# Patient Record
Sex: MALE | Race: BLACK OR AFRICAN AMERICAN | Employment: OTHER | URBAN - METROPOLITAN AREA
[De-identification: names, ages, dates, MRNs, and addresses within clinical notes are randomized per-mention and may not be internally consistent; named-entity substitution may affect disease eponyms.]

---

## 2023-12-18 PROBLEM — L81.9 DYSCHROMIA: Status: RESOLVED | Noted: 2023-05-19 | Resolved: 2023-12-18

## 2023-12-18 PROBLEM — T24.331A: Status: ACTIVE | Noted: 2023-04-11

## 2023-12-18 PROBLEM — L91.0 HYPERTROPHIC BURN SCAR: Status: ACTIVE | Noted: 2023-05-19

## 2023-12-26 ENCOUNTER — TELEPHONE (OUTPATIENT)
Age: 63
End: 2023-12-26

## 2023-12-26 DIAGNOSIS — T24.311S FULL THICKNESS BURN OF RIGHT THIGH, SEQUELA: Primary | ICD-10-CM

## 2023-12-26 DIAGNOSIS — T23.221S: ICD-10-CM

## 2023-12-26 NOTE — TELEPHONE ENCOUNTER
----- Message from Skyler Christine sent at 12/26/2023  1:55 PM EST -----  Subject: Referral Request    Reason for referral request? Pt called in and said he needs to get a   referral for burn unit and OT at [facility]. It needs to be back dated to June 30th. Please reach out and let him know once this has been done. Pt said   he needs to date back to when  dropped him. Provider patient wants to be referred to(if known):     Provider Phone Number(if known):     Additional Information for Provider?   ---------------------------------------------------------------------------  --------------  [address] [place] [address] Travis    [phone]; OK to leave message on voicemail  ---------------------------------------------------------------------------  --------------

## 2023-12-28 NOTE — TELEPHONE ENCOUNTER
Placed these referrals. I see that he has been seeing [facility] burn center and [facility] burn Occupational Therapy. I see that these are necessary visits.     Please take note of the dates that he needs when completing this referral with Tiffani

## 2024-01-19 RX ORDER — PRAVASTATIN SODIUM 40 MG
40 TABLET ORAL DAILY
Qty: 90 TABLET | Refills: 3 | Status: SHIPPED | OUTPATIENT
Start: 2024-01-19

## 2024-01-19 NOTE — TELEPHONE ENCOUNTER
CT chest received from [facility].    Scan was obtained for chest wall pain bothersome with coughing and sneezing.  Lung fields look okay.  Some arthritis of the thoracic spine and an apparent history of rib fractures.  These bony issues can alter his rib function are the likely source of his pain.    Posture will help  Physical therapy might help  Anti-inflammatories might help (already prescribed)    There is coronary artery calcification (CAD) this increases risk of heart attack.  Statin medication is recommended to help reduce this risk.    Pravastatin is pended to encounter if agreeable

## 2024-01-30 ENCOUNTER — OFFICE VISIT (OUTPATIENT)
Age: 64
End: 2024-01-30
Payer: OTHER GOVERNMENT

## 2024-01-30 VITALS
SYSTOLIC BLOOD PRESSURE: 142 MMHG | BODY MASS INDEX: 37.29 KG/M2 | OXYGEN SATURATION: 96 % | DIASTOLIC BLOOD PRESSURE: 79 MMHG | TEMPERATURE: 98.3 F | HEIGHT: 69 IN | WEIGHT: 251.8 LBS | HEART RATE: 60 BPM | RESPIRATION RATE: 17 BRPM

## 2024-01-30 DIAGNOSIS — L72.3 SEBACEOUS CYST: ICD-10-CM

## 2024-01-30 DIAGNOSIS — B35.1 ONYCHOMYCOSIS: ICD-10-CM

## 2024-01-30 DIAGNOSIS — M79.18 ABDOMINAL MUSCLE PAIN: ICD-10-CM

## 2024-01-30 DIAGNOSIS — L20.9 ATOPIC DERMATITIS, UNSPECIFIED TYPE: Primary | ICD-10-CM

## 2024-01-30 PROCEDURE — 99214 OFFICE O/P EST MOD 30 MIN: CPT | Performed by: FAMILY MEDICINE

## 2024-01-30 RX ORDER — TRIAMCINOLONE ACETONIDE 1 MG/G
CREAM TOPICAL
Qty: 80 G | Refills: 5 | Status: SHIPPED | OUTPATIENT
Start: 2024-01-30 | End: 2024-01-31 | Stop reason: SDUPTHER

## 2024-01-30 ASSESSMENT — PATIENT HEALTH QUESTIONNAIRE - PHQ9
SUM OF ALL RESPONSES TO PHQ9 QUESTIONS 1 & 2: 0
SUM OF ALL RESPONSES TO PHQ QUESTIONS 1-9: 0
2. FEELING DOWN, DEPRESSED OR HOPELESS: 0
1. LITTLE INTEREST OR PLEASURE IN DOING THINGS: 0
SUM OF ALL RESPONSES TO PHQ QUESTIONS 1-9: 0

## 2024-01-30 NOTE — PROGRESS NOTES
DEV Cordero is a 63 y.o. male who presents with a concern about a rash at the nape of the left neck present for a week or 2.  Started as a few small bumps and now it is a patch of bumps about 5 cm in diameter.  They are 1-2 mm papules with no surrounding erythema.  They are pruritic.    He had a inflamed cyst left inner thigh that began 5 days ago on Thursday and calm down after about 4 days.  Feels fine now.  Has had a bump here for a while.  Sounds like it has become inflamed a few times in the past.  On exam there is a soft inclusion cyst about 1.5 cm in diameter left inner thigh that is nontender to palpation and not apparently actively inflamed.    Having some discomfort in the back and flanks.  Points across the low back and motions to the bilateral flanks.  This is bother some when he is first standing up until he manages to find a position where he is comfortable straightening up.  The more he moves the better he feels.  It does not appear to affect his sleep.  Is been going on for a week or 2.  There is no known injury.  He is less active than usual        PMHx:  No past medical history on file.    Meds:   Current Outpatient Medications   Medication Sig Dispense Refill    ciclopirox (PENLAC) 8 % solution Apply nail lacquer once daily 6 mL 1    triamcinolone (KENALOG) 0.1 % cream Apply topically 2 times daily. 80 g 5    pravastatin (PRAVACHOL) 40 MG tablet Take 1 tablet by mouth daily 90 tablet 3    tacrolimus (PROGRAF) 1 MG capsule Take 1 capsule by mouth in the morning and 1 capsule in the evening.      lisinopril (PRINIVIL;ZESTRIL) 40 MG tablet Take 1 tablet by mouth daily      amLODIPine (NORVASC) 10 MG tablet Take 0.5 tablets by mouth daily      cetirizine (ZYRTEC) 10 MG tablet Take 1 tablet by mouth daily      vitamin E 400 UNIT capsule Take 1 capsule by mouth daily      Magnesium Oxide 420 MG TABS Take 420 mg by mouth 2 times daily      Saw Palmetto, Serenoa repens, (SAW PALMETTO PO) Take by

## 2024-01-30 NOTE — PROGRESS NOTES
Chief Complaint   Patient presents with    Skin Problem         Health Maintenance Due   Topic Date Due    COVID-19 Vaccine (1) Never done    Pneumococcal 0-64 years Vaccine (1 - PCV) Never done    Lipids  Never done    HIV screen  Never done    Hepatitis C screen  Never done    Diabetes screen  Never done    Colorectal Cancer Screen  Never done         \"Have you been to the ER, urgent care clinic since your last visit?  Hospitalized since your last visit?\"    NO    “Have you seen or consulted any other health care providers outside of [facility] since your last visit?”    YES - When: approximately [facility] ago.  Where and Why: Burn Unit.    “Have you had a colorectal cancer screening such as a colonoscopy/FIT/Cologuard?    YES - Type: Colonoscopy - Where: [facility] -pt authorize record release Nurse/CMA to request most recent records if not in the chart

## 2024-01-31 ENCOUNTER — TELEPHONE (OUTPATIENT)
Age: 64
End: 2024-01-31

## 2024-01-31 DIAGNOSIS — L20.9 ATOPIC DERMATITIS, UNSPECIFIED TYPE: ICD-10-CM

## 2024-01-31 DIAGNOSIS — B35.1 ONYCHOMYCOSIS: ICD-10-CM

## 2024-01-31 RX ORDER — TRIAMCINOLONE ACETONIDE 1 MG/G
CREAM TOPICAL
Qty: 80 G | Refills: 5 | Status: SHIPPED | OUTPATIENT
Start: 2024-01-31

## 2024-01-31 NOTE — TELEPHONE ENCOUNTER
Pt is calling stating pharmacy is still stating they haven't received his med  [facility] [place] I have change that

## 2024-02-05 ENCOUNTER — OFFICE VISIT (OUTPATIENT)
Age: 64
End: 2024-02-05
Payer: OTHER GOVERNMENT

## 2024-02-05 VITALS
OXYGEN SATURATION: 95 % | RESPIRATION RATE: 20 BRPM | TEMPERATURE: 97 F | WEIGHT: 245 LBS | DIASTOLIC BLOOD PRESSURE: 76 MMHG | BODY MASS INDEX: 36.29 KG/M2 | SYSTOLIC BLOOD PRESSURE: 133 MMHG | HEART RATE: 66 BPM | HEIGHT: 69 IN

## 2024-02-05 DIAGNOSIS — L02.92 BOIL: Primary | ICD-10-CM

## 2024-02-05 PROCEDURE — 99203 OFFICE O/P NEW LOW 30 MIN: CPT | Performed by: SURGERY

## 2024-02-05 ASSESSMENT — PATIENT HEALTH QUESTIONNAIRE - PHQ9
SUM OF ALL RESPONSES TO PHQ QUESTIONS 1-9: 0
2. FEELING DOWN, DEPRESSED OR HOPELESS: 0
SUM OF ALL RESPONSES TO PHQ QUESTIONS 1-9: 0
SUM OF ALL RESPONSES TO PHQ9 QUESTIONS 1 & 2: 0
1. LITTLE INTEREST OR PLEASURE IN DOING THINGS: 0

## 2024-02-05 NOTE — PROGRESS NOTES
Identified pt with two pt identifiers (name and ). Reviewed chart in preparation for visit and have obtained necessary documentation.    Kentrell Cordero is a 63 y.o. male  Chief Complaint   Patient presents with    Skin Problem     Seen at the request of Dr. Andre Woody, eval cyst on right abdomin.      /76 (Site: Left Upper Arm, Position: Sitting, Cuff Size: Large Adult)   Pulse 66   Temp 97 °F (36.1 °C) (Temporal)   Resp 20   Ht 1.753 m (5' 9\")   Wt 111.1 kg (245 lb)   SpO2 95%   BMI 36.18 kg/m²     1. Have you been to the ER, urgent care clinic since your last visit?  Hospitalized since your last visit?no    2. Have you seen or consulted any other health care providers outside of the [facility] since your last visit?  Include any pap smears or colon screening. no

## 2024-02-06 ENCOUNTER — PREP FOR PROCEDURE (OUTPATIENT)
Age: 64
End: 2024-02-06

## 2024-02-06 DIAGNOSIS — L72.0 EPIDERMAL CYST: ICD-10-CM

## 2024-02-07 ENCOUNTER — HOSPITAL ENCOUNTER (OUTPATIENT)
Facility: HOSPITAL | Age: 64
Discharge: HOME | End: 2024-02-10
Payer: OTHER GOVERNMENT

## 2024-02-07 VITALS
RESPIRATION RATE: 18 BRPM | TEMPERATURE: 97.9 F | BODY MASS INDEX: 37.62 KG/M2 | SYSTOLIC BLOOD PRESSURE: 149 MMHG | HEART RATE: 60 BPM | DIASTOLIC BLOOD PRESSURE: 76 MMHG | HEIGHT: 68 IN | WEIGHT: 248.24 LBS | OXYGEN SATURATION: 97 %

## 2024-02-07 LAB
ANION GAP SERPL CALC-SCNC: 2 MMOL/L (ref 5–15)
BUN SERPL-MCNC: 16 MG/DL (ref 6–20)
BUN/CREAT SERPL: 15 (ref 12–20)
CALCIUM SERPL-MCNC: 8.6 MG/DL (ref 8.5–10.1)
CHLORIDE SERPL-SCNC: 111 MMOL/L (ref 97–108)
CO2 SERPL-SCNC: 28 MMOL/L (ref 21–32)
CREAT SERPL-MCNC: 1.04 MG/DL (ref 0.7–1.3)
ERYTHROCYTE [DISTWIDTH] IN BLOOD BY AUTOMATED COUNT: 12.3 % (ref 11.5–14.5)
GLUCOSE SERPL-MCNC: 98 MG/DL (ref 65–100)
HCT VFR BLD AUTO: 40.4 % (ref 36.6–50.3)
HGB BLD-MCNC: 13.7 G/DL (ref 12.1–17)
MCH RBC QN AUTO: 31.4 PG (ref 26–34)
MCHC RBC AUTO-ENTMCNC: 33.9 G/DL (ref 30–36.5)
MCV RBC AUTO: 92.7 FL (ref 80–99)
NRBC # BLD: 0 K/UL (ref 0–0.01)
NRBC BLD-RTO: 0 PER 100 WBC
PLATELET # BLD AUTO: 115 K/UL (ref 150–400)
PMV BLD AUTO: 11 FL (ref 8.9–12.9)
POTASSIUM SERPL-SCNC: 4.1 MMOL/L (ref 3.5–5.1)
RBC # BLD AUTO: 4.36 M/UL (ref 4.1–5.7)
SODIUM SERPL-SCNC: 141 MMOL/L (ref 136–145)
WBC # BLD AUTO: 3 K/UL (ref 4.1–11.1)

## 2024-02-07 PROCEDURE — 85027 COMPLETE CBC AUTOMATED: CPT

## 2024-02-07 PROCEDURE — 80048 BASIC METABOLIC PNL TOTAL CA: CPT

## 2024-02-07 PROCEDURE — 36415 COLL VENOUS BLD VENIPUNCTURE: CPT

## 2024-02-07 RX ORDER — SODIUM CHLORIDE, SODIUM LACTATE, POTASSIUM CHLORIDE, CALCIUM CHLORIDE 600; 310; 30; 20 MG/100ML; MG/100ML; MG/100ML; MG/100ML
INJECTION, SOLUTION INTRAVENOUS CONTINUOUS
OUTPATIENT
Start: 2024-02-14

## 2024-02-07 RX ORDER — MULTIVIT WITH MINERALS/LUTEIN
1000 TABLET ORAL DAILY
COMMUNITY

## 2024-02-07 ASSESSMENT — PAIN SCALES - GENERAL: PAINLEVEL_OUTOF10: 0

## 2024-02-07 NOTE — PROGRESS NOTES
Wear your hair loose or down, no ponytails, buns, ren pins or clips.  All body piercings must be removed.  Please shower with antibacterial soap for three consecutive days before and on the morning of surgery, but do not apply any lotions, powders or deodorants after the shower on the day of surgery. Please use a fresh towels after each shower. Please sleep in clean clothes and change bed linens the night before surgery.  Please do not shave for 48 hours prior to surgery. Shaving of the face is acceptable.    7. You should understand that if you do not follow these instructions your surgery may be cancelled.  If your physical condition changes (I.e. fever, cold or flu) please contact your surgeon as soon as possible.    8. It is important that you be on time.  If a situation occurs where you may be late, please call [phone] (OR Holding Area).    9. If you have any questions and or problems, please call [phone] (Pre-admission Testing).    10. Your surgery time may be subject to change.  You will receive a phone call the evening prior if your time changes.    11.  If having outpatient surgery, you must have someone to drive you here, stay with you during the duration of your stay, and to drive you home at time of discharge.    Special Instructions: vitamins and supplements and diclofenac per Dr Moctezuma prior to surgery     TAKE ALL MEDICATIONS DAY OF SURGERY EXCEPT: no vitamins and supplements, no topical creams.  May take morning medications with a sip of water.     I understand a pre-operative phone call will be made to verify my surgery time.  In the event that I am not available, I give permission for a message to be left on my answering service and/or with another person?  yes         ___________________      __________   _________    (Signature of Patient)             (Witness)                (Date and Time)

## 2024-02-09 NOTE — PROGRESS NOTES
To: Andre Lopez MD    From: Aaron Moctezuma MD    Thank you for sending Kentrell Cordero to see us.     Please note that this dictation was completed with Entrec, the computer voice recognition software.  Quite often unanticipated grammatical, syntax, homophones, and other interpretive errors are inadvertently transcribed by the software.  Please disregard these errors.  Please excuse any errors that have escaped final proofreading.      Encounter Date: 2/5/2024  History and Physical    Assessment:   Left upper inner thigh chronic skin appendage inflammation.  No cellulitis.    Body mass index is 36.18 kg/m².    No anticoagulation but he is on immunosuppression for prior liver transplant.    Plan:   I recommended excision in the OR due to its location and risk for infection especially in the setting of immunosuppression.    Risks including bleeding and infection were explained to the patient.  The patient expressed understanding of the risks, and all questions were answered to the patient's satisfaction.    Afterwards he should follow-up with dermatology and/or infectious disease.  We will get cultures if there is any abscess.    HPI:   Kentrell Cordero is a 63 y.o. male who is seen in consultation at the request of Andre Lopez MD for evaluation of a cyst on the upper inner left thigh.  It was first noticed several weeks.  It has been inflamed.  It has been painful.  There has not been drainage.    Past Medical History:   Diagnosis Date    At risk for sleep apnea 02/07/2024    stop bang score-5. faxed to pcp- dr andre lopez    Hyperlipidemia     Hypertension     Liver disease     Osteoarthritis      Past Surgical History:   Procedure Laterality Date    LIVER TRANSPLANT  2003    MANDIBLE SURGERY Left     WRIST SURGERY Right 2007      Family History   Problem Relation Age of Onset    Stroke Mother     Heart Attack Mother     Bone Cancer Brother     Cancer Maternal Aunt     Prostate Cancer Paternal Uncle

## 2024-02-09 NOTE — H&P (VIEW-ONLY)
To: Andre Lopez MD    From: Aaron Moctezuma MD    Thank you for sending Kentrell Cordero to see us.     Please note that this dictation was completed with Zumigo, the computer voice recognition software.  Quite often unanticipated grammatical, syntax, homophones, and other interpretive errors are inadvertently transcribed by the software.  Please disregard these errors.  Please excuse any errors that have escaped final proofreading.      Encounter Date: 2/5/2024  History and Physical    Assessment:   Left upper inner thigh chronic skin appendage inflammation.  No cellulitis.    Body mass index is 36.18 kg/m².    No anticoagulation but he is on immunosuppression for prior liver transplant.    Plan:   I recommended excision in the OR due to its location and risk for infection especially in the setting of immunosuppression.    Risks including bleeding and infection were explained to the patient.  The patient expressed understanding of the risks, and all questions were answered to the patient's satisfaction.    Afterwards he should follow-up with dermatology and/or infectious disease.  We will get cultures if there is any abscess.    HPI:   Kentrell Cordero is a 63 y.o. male who is seen in consultation at the request of Andre Lopez MD for evaluation of a cyst on the upper inner left thigh.  It was first noticed several weeks.  It has been inflamed.  It has been painful.  There has not been drainage.    Past Medical History:   Diagnosis Date    At risk for sleep apnea 02/07/2024    stop bang score-5. faxed to pcp- dr andre lopez    Hyperlipidemia     Hypertension     Liver disease     Osteoarthritis      Past Surgical History:   Procedure Laterality Date    LIVER TRANSPLANT  2003    MANDIBLE SURGERY Left     WRIST SURGERY Right 2007      Family History   Problem Relation Age of Onset    Stroke Mother     Heart Attack Mother     Bone Cancer Brother     Cancer Maternal Aunt     Prostate Cancer Paternal Uncle

## 2024-02-14 ENCOUNTER — HOSPITAL ENCOUNTER (OUTPATIENT)
Facility: HOSPITAL | Age: 64
Setting detail: OUTPATIENT SURGERY
Discharge: HOME | End: 2024-02-14
Attending: SURGERY | Admitting: SURGERY
Payer: OTHER GOVERNMENT

## 2024-02-14 ENCOUNTER — ANESTHESIA EVENT (OUTPATIENT)
Facility: HOSPITAL | Age: 64
End: 2024-02-14
Payer: OTHER GOVERNMENT

## 2024-02-14 ENCOUNTER — ANESTHESIA (OUTPATIENT)
Facility: HOSPITAL | Age: 64
End: 2024-02-14
Payer: OTHER GOVERNMENT

## 2024-02-14 VITALS
DIASTOLIC BLOOD PRESSURE: 95 MMHG | HEIGHT: 68 IN | BODY MASS INDEX: 36.72 KG/M2 | HEART RATE: 59 BPM | SYSTOLIC BLOOD PRESSURE: 166 MMHG | RESPIRATION RATE: 14 BRPM | TEMPERATURE: 98 F | OXYGEN SATURATION: 99 % | WEIGHT: 242.29 LBS

## 2024-02-14 DIAGNOSIS — L72.0 EPIDERMAL CYST: Primary | ICD-10-CM

## 2024-02-14 PROCEDURE — 3600000012 HC SURGERY LEVEL 2 ADDTL 15MIN: Performed by: SURGERY

## 2024-02-14 PROCEDURE — 7100000011 HC PHASE II RECOVERY - ADDTL 15 MIN: Performed by: SURGERY

## 2024-02-14 PROCEDURE — 3700000000 HC ANESTHESIA ATTENDED CARE: Performed by: SURGERY

## 2024-02-14 PROCEDURE — 2580000003 HC RX 258: Performed by: SURGERY

## 2024-02-14 PROCEDURE — 6360000002 HC RX W HCPCS: Performed by: SURGERY

## 2024-02-14 PROCEDURE — 7100000001 HC PACU RECOVERY - ADDTL 15 MIN: Performed by: SURGERY

## 2024-02-14 PROCEDURE — 88304 TISSUE EXAM BY PATHOLOGIST: CPT

## 2024-02-14 PROCEDURE — 3600000002 HC SURGERY LEVEL 2 BASE: Performed by: SURGERY

## 2024-02-14 PROCEDURE — 2580000003 HC RX 258: Performed by: ANESTHESIOLOGY

## 2024-02-14 PROCEDURE — 7100000010 HC PHASE II RECOVERY - FIRST 15 MIN: Performed by: SURGERY

## 2024-02-14 PROCEDURE — 6360000002 HC RX W HCPCS: Performed by: ANESTHESIOLOGY

## 2024-02-14 PROCEDURE — 2500000003 HC RX 250 WO HCPCS: Performed by: ANESTHESIOLOGY

## 2024-02-14 PROCEDURE — 2709999900 HC NON-CHARGEABLE SUPPLY: Performed by: SURGERY

## 2024-02-14 PROCEDURE — 7100000000 HC PACU RECOVERY - FIRST 15 MIN: Performed by: SURGERY

## 2024-02-14 PROCEDURE — 3700000001 HC ADD 15 MINUTES (ANESTHESIA): Performed by: SURGERY

## 2024-02-14 RX ORDER — OXYCODONE HYDROCHLORIDE 5 MG/1
5 TABLET ORAL EVERY 6 HOURS PRN
Qty: 12 TABLET | Refills: 0 | Status: SHIPPED | OUTPATIENT
Start: 2024-02-14 | End: 2024-02-17

## 2024-02-14 RX ORDER — BUPIVACAINE HYDROCHLORIDE 5 MG/ML
INJECTION, SOLUTION PERINEURAL PRN
Status: DISCONTINUED | OUTPATIENT
Start: 2024-02-14 | End: 2024-02-14 | Stop reason: ALTCHOICE

## 2024-02-14 RX ORDER — SODIUM CHLORIDE 0.9 % (FLUSH) 0.9 %
5-40 SYRINGE (ML) INJECTION EVERY 12 HOURS SCHEDULED
Status: DISCONTINUED | OUTPATIENT
Start: 2024-02-14 | End: 2024-02-14 | Stop reason: HOSPADM

## 2024-02-14 RX ORDER — HYDROMORPHONE HYDROCHLORIDE 1 MG/ML
0.5 INJECTION, SOLUTION INTRAMUSCULAR; INTRAVENOUS; SUBCUTANEOUS EVERY 5 MIN PRN
Status: DISCONTINUED | OUTPATIENT
Start: 2024-02-14 | End: 2024-02-14 | Stop reason: HOSPADM

## 2024-02-14 RX ORDER — MIDAZOLAM HYDROCHLORIDE 1 MG/ML
INJECTION INTRAMUSCULAR; INTRAVENOUS PRN
Status: DISCONTINUED | OUTPATIENT
Start: 2024-02-14 | End: 2024-02-14 | Stop reason: SDUPTHER

## 2024-02-14 RX ORDER — SODIUM CHLORIDE, SODIUM LACTATE, POTASSIUM CHLORIDE, CALCIUM CHLORIDE 600; 310; 30; 20 MG/100ML; MG/100ML; MG/100ML; MG/100ML
INJECTION, SOLUTION INTRAVENOUS CONTINUOUS
Status: DISCONTINUED | OUTPATIENT
Start: 2024-02-14 | End: 2024-02-14 | Stop reason: HOSPADM

## 2024-02-14 RX ORDER — MEPERIDINE HYDROCHLORIDE 25 MG/ML
12.5 INJECTION INTRAMUSCULAR; INTRAVENOUS; SUBCUTANEOUS EVERY 5 MIN PRN
Status: DISCONTINUED | OUTPATIENT
Start: 2024-02-14 | End: 2024-02-14 | Stop reason: HOSPADM

## 2024-02-14 RX ORDER — PROPOFOL 10 MG/ML
INJECTION, EMULSION INTRAVENOUS PRN
Status: DISCONTINUED | OUTPATIENT
Start: 2024-02-14 | End: 2024-02-14 | Stop reason: SDUPTHER

## 2024-02-14 RX ORDER — SODIUM CHLORIDE 0.9 % (FLUSH) 0.9 %
5-40 SYRINGE (ML) INJECTION PRN
Status: DISCONTINUED | OUTPATIENT
Start: 2024-02-14 | End: 2024-02-14 | Stop reason: HOSPADM

## 2024-02-14 RX ORDER — DEXAMETHASONE SODIUM PHOSPHATE 4 MG/ML
INJECTION, SOLUTION INTRA-ARTICULAR; INTRALESIONAL; INTRAMUSCULAR; INTRAVENOUS; SOFT TISSUE PRN
Status: DISCONTINUED | OUTPATIENT
Start: 2024-02-14 | End: 2024-02-14 | Stop reason: SDUPTHER

## 2024-02-14 RX ORDER — FENTANYL CITRATE 50 UG/ML
INJECTION, SOLUTION INTRAMUSCULAR; INTRAVENOUS PRN
Status: DISCONTINUED | OUTPATIENT
Start: 2024-02-14 | End: 2024-02-14 | Stop reason: SDUPTHER

## 2024-02-14 RX ORDER — SODIUM CHLORIDE 9 MG/ML
INJECTION, SOLUTION INTRAVENOUS PRN
Status: DISCONTINUED | OUTPATIENT
Start: 2024-02-14 | End: 2024-02-14 | Stop reason: HOSPADM

## 2024-02-14 RX ORDER — ONDANSETRON 2 MG/ML
INJECTION INTRAMUSCULAR; INTRAVENOUS PRN
Status: DISCONTINUED | OUTPATIENT
Start: 2024-02-14 | End: 2024-02-14 | Stop reason: SDUPTHER

## 2024-02-14 RX ORDER — FENTANYL CITRATE 50 UG/ML
25 INJECTION, SOLUTION INTRAMUSCULAR; INTRAVENOUS EVERY 5 MIN PRN
Status: DISCONTINUED | OUTPATIENT
Start: 2024-02-14 | End: 2024-02-14 | Stop reason: HOSPADM

## 2024-02-14 RX ORDER — LIDOCAINE HYDROCHLORIDE 20 MG/ML
INJECTION, SOLUTION EPIDURAL; INFILTRATION; INTRACAUDAL; PERINEURAL PRN
Status: DISCONTINUED | OUTPATIENT
Start: 2024-02-14 | End: 2024-02-14 | Stop reason: SDUPTHER

## 2024-02-14 RX ORDER — ONDANSETRON 2 MG/ML
4 INJECTION INTRAMUSCULAR; INTRAVENOUS
Status: DISCONTINUED | OUTPATIENT
Start: 2024-02-14 | End: 2024-02-14 | Stop reason: HOSPADM

## 2024-02-14 RX ORDER — DROPERIDOL 2.5 MG/ML
0.62 INJECTION, SOLUTION INTRAMUSCULAR; INTRAVENOUS
Status: DISCONTINUED | OUTPATIENT
Start: 2024-02-14 | End: 2024-02-14 | Stop reason: HOSPADM

## 2024-02-14 RX ORDER — LIDOCAINE HYDROCHLORIDE 10 MG/ML
1 INJECTION, SOLUTION EPIDURAL; INFILTRATION; INTRACAUDAL; PERINEURAL
Status: DISCONTINUED | OUTPATIENT
Start: 2024-02-14 | End: 2024-02-14 | Stop reason: HOSPADM

## 2024-02-14 RX ORDER — POLYETHYLENE GLYCOL 3350 17 G/17G
17 POWDER, FOR SOLUTION ORAL DAILY
Qty: 116 G | Refills: 0 | Status: SHIPPED | OUTPATIENT
Start: 2024-02-14 | End: 2024-02-21

## 2024-02-14 RX ADMIN — DEXAMETHASONE SODIUM PHOSPHATE 4 MG: 4 INJECTION INTRA-ARTICULAR; INTRALESIONAL; INTRAMUSCULAR; INTRAVENOUS; SOFT TISSUE at 10:28

## 2024-02-14 RX ADMIN — PROPOFOL 170 MG: 10 INJECTION, EMULSION INTRAVENOUS at 10:13

## 2024-02-14 RX ADMIN — WATER 2000 MG: 1 INJECTION INTRAMUSCULAR; INTRAVENOUS; SUBCUTANEOUS at 10:19

## 2024-02-14 RX ADMIN — MIDAZOLAM HYDROCHLORIDE 2 MG: 1 INJECTION, SOLUTION INTRAMUSCULAR; INTRAVENOUS at 10:08

## 2024-02-14 RX ADMIN — SODIUM CHLORIDE, POTASSIUM CHLORIDE, SODIUM LACTATE AND CALCIUM CHLORIDE: 600; 310; 30; 20 INJECTION, SOLUTION INTRAVENOUS at 08:03

## 2024-02-14 RX ADMIN — FENTANYL CITRATE 25 MCG: 50 INJECTION, SOLUTION INTRAMUSCULAR; INTRAVENOUS at 10:28

## 2024-02-14 RX ADMIN — LIDOCAINE HYDROCHLORIDE 60 MG: 20 INJECTION, SOLUTION EPIDURAL; INFILTRATION; INTRACAUDAL; PERINEURAL at 10:13

## 2024-02-14 RX ADMIN — FENTANYL CITRATE 25 MCG: 50 INJECTION, SOLUTION INTRAMUSCULAR; INTRAVENOUS at 10:18

## 2024-02-14 RX ADMIN — PROPOFOL 30 MG: 10 INJECTION, EMULSION INTRAVENOUS at 10:14

## 2024-02-14 RX ADMIN — FENTANYL CITRATE 50 MCG: 50 INJECTION, SOLUTION INTRAMUSCULAR; INTRAVENOUS at 10:35

## 2024-02-14 RX ADMIN — ONDANSETRON 4 MG: 2 INJECTION INTRAMUSCULAR; INTRAVENOUS at 10:28

## 2024-02-14 RX ADMIN — SODIUM CHLORIDE, POTASSIUM CHLORIDE, SODIUM LACTATE AND CALCIUM CHLORIDE: 600; 310; 30; 20 INJECTION, SOLUTION INTRAVENOUS at 10:08

## 2024-02-14 NOTE — ANESTHESIA PRE PROCEDURE
transplant 2003):     (-) GERD       Endo/Other: Negative Endo/Other ROS             Pt had PAT visit.       Abdominal:              PE comment: Deferred   Vascular: negative vascular ROS.         Other Findings:       Anesthesia Plan      general     ASA 2       Induction: intravenous.  BIS  MIPS: Postoperative opioids intended and Prophylactic antiemetics administered.  Anesthetic plan and risks discussed with patient.      Plan discussed with CRNA.    Attending anesthesiologist reviewed and agrees with Preprocedure content            Marielle Saenz MD   2/14/2024

## 2024-02-14 NOTE — DISCHARGE INSTRUCTIONS
Discharge Instructions:  Dr. Moctezuma    Call on next business day to arrange appointment for follow up in 3 weeks (1 week if you have a drain in place) -- [phone].    Activity:  Walk regularly.  No lifting more than 10 -15 pounds for 4 weeks.  Light aerobic activity is okay when you feel up to it.    You may resume driving in three days unless still requiring narcotics for pain.      Work:  You may return to work in 1 or 2 weeks to light activity. No lifting more than 10 pounds (=\"light duty\") for four weeks.    If your employer can not accommodate \"light duty,\" your employer will need to provide any necessary paperwork to our office.   This document with serve as the initial \"note\" to your employer.    Diet:  You may resume normal diet.    Wound Care:  Glue dressing will fall off on its own.  No swimming or baths for 2 weeks.  If you experience a lot of drainage from the incisions, develop redness around the wound, or a fever over 101 F occurs please call the office.      Medications:  See attached \"Medication Reconciliation.\"    PUT ICE ON YOUR INCISION 20 MINUTES EVERY HOUR WHILE AWAKE FOR THE NEXT 3 DAYS AT LEAST.  PLACE A THIN CLOTH BETWEEN YOUR SKIN AND THE ICE BAG.    Miralax should be used twice daily to prevent constipation while on narcotics.  If you are still having trouble having a BM after 1-2 days, try milk of magnesia.  If this does not work within 24 hours, try a bottle of magnesium citrate.  If this does not work, call us.    Narcotics and anesthesia sometimes cause nausea and vomiting.  If persistent please call the office.    Do not hesitate to call with questions or concerns.  Aaron Moctezuma MD  Tel [phone]  Fax [phone]   DISCHARGE SUMMARY from Nurse    PATIENT INSTRUCTIONS:    After general anesthesia or intravenous sedation, for 24 hours or while taking prescription narcotics:    Have someone responsible help you with your care  Limit your activities  Do not drive and operate

## 2024-02-14 NOTE — OP NOTE
DATE OF PROCEDURE:  2/14/2024     PREOPERATIVE DIAGNOSIS:  draining epidermal cyst     POSTOPERATIVE DIAGNOSIS:   same    OPERATIVE PROCEDURE: Cyst excision    SURGEON:  Aaron Moctezuma MD    ANESTHESIA:  General.    EBL: minimal    DRAINS: None    SPECIMENS:   ID Type Source Tests Collected by Time Destination   1 : SKIN CYST Tissue Thigh SURGICAL PATHOLOGY Aaron Moctezuma MD 2/14/2024 1028         FINDINGS:  Chronic sinus tract approximately 0.5-1cm    INDICATIONS:  Chronic discharge.      DESCRIPTION OF PROCEDURE:    After obtaining informed consent the patient was taken the operating room and placed supine on the operating table.  An operative timeout was performed and general anesthesia was achieved.  The patient was then placed in lithotomy position and the area was prepped and draped in the usual manner.  Local anesthesia was infiltrated into the skin and soft tissues surrounding the lesion.  An elliptical incision was made with the lesion at the center.  This was taken down into subcutaneous tissues with blunt and sharp dissection and the lesion was enucleated.  It  was sent for pathologic evaluation.  The incision was closed with 3-0 Vicryl.  Interrupted sutures then a running 4-0 Monocryl in the subcuticular layer.  Glue dressing was then applied.  The patient was recovered from general anesthesia and taken the recovery area in satisfactory condition.  All instrument sponge needle counts were reported as correct.       Aaron Moctezuma MD

## 2024-02-14 NOTE — ANESTHESIA POSTPROCEDURE EVALUATION
Department of Anesthesiology  Postprocedure Note    Patient: Kentrell Cordero  MRN: [id number]  YOB: 1960  Date of evaluation: 2/14/2024    Procedure Summary       Date: 02/14/24 Room / Location: [facility] MA[facility] OR [facility] / [facility] MA[facility] OR    Anesthesia Start: 1008 Anesthesia Stop: 1054    Procedure: EXCISION OF LEFT UPPER INNER THIGH CYST (Left: Thigh) Diagnosis:       Epidermal cyst      (Epidermal cyst [L72.0])    Providers: Aaron Moctezuma MD Responsible Provider: Marielle Saenz MD    Anesthesia Type: general ASA Status: 2            Anesthesia Type: No value filed.    Madhuri Phase I: Madhuri Score: 10    Madhuri Phase II:      Anesthesia Post Evaluation    Patient location during evaluation: bedside  Patient participation: complete - patient participated  Level of consciousness: awake and alert  Pain scale: Controlled per protocol.  Airway patency: patent  Nausea & Vomiting: no nausea and no vomiting  Cardiovascular status: hemodynamically stable  Respiratory status: acceptable  Hydration status: stable  Multimodal analgesia pain management approach  Pain management: adequate    No notable events documented.

## 2024-02-14 NOTE — FLOWSHEET NOTE
02/14/24 1055   Handoff   Communication Given Transfer Handoff   Handoff phase Phase I receiving   Handoff Given To Hazel Leon RN   Handoff Received From BULMARO Khalil RN and Dr. Saenz   Handoff Communication Face to Face;At bedside     1125 Report given to Jo BURT.  Transferred to Phase II for discharge instructions.  Verbal sign out from Dr. Saenz.

## 2024-03-05 ENCOUNTER — OFFICE VISIT (OUTPATIENT)
Age: 64
End: 2024-03-05
Payer: OTHER GOVERNMENT

## 2024-03-05 VITALS
RESPIRATION RATE: 20 BRPM | WEIGHT: 243.6 LBS | HEART RATE: 63 BPM | BODY MASS INDEX: 36.92 KG/M2 | HEIGHT: 68 IN | DIASTOLIC BLOOD PRESSURE: 73 MMHG | OXYGEN SATURATION: 92 % | TEMPERATURE: 97.5 F | SYSTOLIC BLOOD PRESSURE: 155 MMHG

## 2024-03-05 DIAGNOSIS — L72.0 EPIDERMAL CYST: Primary | ICD-10-CM

## 2024-03-05 PROCEDURE — 99212 OFFICE O/P EST SF 10 MIN: CPT | Performed by: SURGERY

## 2024-03-05 RX ORDER — LIDOCAINE HYDROCHLORIDE 10 MG/ML
10 INJECTION, SOLUTION INFILTRATION; PERINEURAL ONCE
Status: DISCONTINUED | OUTPATIENT
Start: 2024-03-05 | End: 2024-03-05

## 2024-03-05 ASSESSMENT — PATIENT HEALTH QUESTIONNAIRE - PHQ9
SUM OF ALL RESPONSES TO PHQ QUESTIONS 1-9: 0
SUM OF ALL RESPONSES TO PHQ9 QUESTIONS 1 & 2: 0
SUM OF ALL RESPONSES TO PHQ QUESTIONS 1-9: 0
1. LITTLE INTEREST OR PLEASURE IN DOING THINGS: 0
SUM OF ALL RESPONSES TO PHQ QUESTIONS 1-9: 0
SUM OF ALL RESPONSES TO PHQ QUESTIONS 1-9: 0
2. FEELING DOWN, DEPRESSED OR HOPELESS: 0

## 2024-03-05 NOTE — PROGRESS NOTES
Status post excision of draining sinus tract in the right upper inner thigh on 2/14/2024.  Pathology revealed chronic cyst with foreign body reaction.    On exam, there is a small spot of skin opening but there is eschar here.  It is otherwise well-healed.  There is no evidence of infection.    Assessment and plan: Appropriate recovery.  Told to call for any concerns.    Thank you.

## 2024-03-05 NOTE — PROGRESS NOTES
Identified pt with two pt identifiers(name and ). Reviewed record in preparation for visit and have obtained necessary documentation. All patient medications has been reviewed.    Chief Complaint   Patient presents with    Post-Op Check      3 Week post op excision of upper thigh cyst       Health Maintenance Due   Topic    Lipids     HIV screen     Hepatitis C screen     Colorectal Cancer Screen     Pneumococcal 0-64 years Vaccine (2 - PPSV23 or PCV20)    COVID-19 Vaccine ( season)       Vitals:    24 1302 24 1339   BP: (!) 161/90 (!) 155/73   Site: Right Upper Arm    Position: Sitting    Pulse: 63    Resp: 20    Temp: 97.5 °F (36.4 °C)    TempSrc: Temporal    SpO2: 92%    Weight: 110.5 kg (243 lb 9.6 oz)    Height: 1.727 m (5' 8\")          4.Have you been to the ER, urgent care clinic since your last visit?  Hospitalized since your last visit? No      5. Have you seen or consulted any other health care providers outside of the [facility] since your last visit?  Include any pap smears or colon screening. No      Patient is accompanied by self I have received verbal consent from Kentrell Cordero to discuss any/all medical information while they are present in the room.     Patient and provider made aware of elevated BP x2. Patient asymptomatic. Patient reminded to monitor BP, continue to take BP medications if prescribed, and follow up with PCP/Cardiologist.  Patient expressed understanding and agreement.       Will check when finish with visit.

## 2024-03-05 NOTE — PROGRESS NOTES
[unfilled]  OFFICE PROCEDURE PROGRESS NOTE        Chart reviewed for the following:   TEOFILO AYALA LPN, have reviewed the History, Physical and updated the Allergic reactions for Kentrell Cordero, 1960     TIME OUT performed immediately prior to start of procedure:   TEOFILO AYALA LPN, have performed the following reviews on Kentrell Cordero prior to the start of the procedure:            * Patient was identified by name and date of birth   * Agreement on procedure being performed was verified  * Risks and Benefits explained to the patient  * Procedure site verified and marked as necessary  * Patient was positioned for comfort  * Consent was signed and verified     Time: 15:15      Date of procedure: 3/5/2024    Procedure performed by:  Aaron Moctezuma MD    Provider assisted by: teofilo tuppince LPN    Patient assisted by: self    How tolerated by patient: tolerated the procedure well with no complications    Post Procedural Pain Scale: 0 - No Hurt    Comments:  post procedure instructions via provider

## 2024-05-15 RX ORDER — DICLOFENAC SODIUM 75 MG/1
75 TABLET, DELAYED RELEASE ORAL 2 TIMES DAILY PRN
Qty: 60 TABLET | Refills: 3 | Status: SHIPPED | OUTPATIENT
Start: 2024-05-15

## 2024-07-10 DIAGNOSIS — B35.1 ONYCHOMYCOSIS: ICD-10-CM

## 2024-07-10 RX ORDER — CICLOPIROX 80 MG/ML
SOLUTION TOPICAL
Qty: 6.6 ML | Refills: 3 | Status: SHIPPED | OUTPATIENT
Start: 2024-07-10

## 2024-09-03 RX ORDER — DICLOFENAC SODIUM 75 MG/1
75 TABLET, DELAYED RELEASE ORAL 2 TIMES DAILY PRN
Qty: 60 TABLET | Refills: 3 | Status: SHIPPED | OUTPATIENT
Start: 2024-09-03

## 2025-01-22 RX ORDER — DICLOFENAC SODIUM 75 MG/1
75 TABLET, DELAYED RELEASE ORAL 2 TIMES DAILY PRN
Qty: 60 TABLET | Refills: 3 | Status: SHIPPED | OUTPATIENT
Start: 2025-01-22

## 2025-05-05 RX ORDER — DICLOFENAC SODIUM 75 MG/1
75 TABLET, DELAYED RELEASE ORAL 2 TIMES DAILY PRN
Qty: 60 TABLET | Refills: 3 | Status: SHIPPED | OUTPATIENT
Start: 2025-05-05

## (undated) DEVICE — SUTURE MCRYL SZ 4-0 L27IN ABSRB UD L19MM PS-2 1/2 CIR PRIM Y426H

## (undated) DEVICE — BANDAGE,ELASTIC,ESMARK,STERILE,4"X9',LF: Brand: MEDLINE

## (undated) DEVICE — APPLICATOR MEDICATED 26 CC SOLUTION HI LT ORNG CHLORAPREP

## (undated) DEVICE — SOLUTION IRRIG 1000ML 0.9% SOD CHL USP POUR PLAS BTL

## (undated) DEVICE — BLADE ES ELASTOMERIC COAT INSUL DURABLE BEND UPTO 90DEG

## (undated) DEVICE — EXTREMITY-MRMC: Brand: MEDLINE INDUSTRIES, INC.

## (undated) DEVICE — SYRINGE MED 10ML LUERLOCK TIP W/O SFTY DISP

## (undated) DEVICE — SUTURE VCRL SZ 3-0 L27IN ABSRB UD L26MM SH 1/2 CIR J416H

## (undated) DEVICE — HYPODERMIC SAFETY NEEDLE: Brand: MAGELLAN